# Patient Record
Sex: MALE | Race: WHITE | NOT HISPANIC OR LATINO | URBAN - NONMETROPOLITAN AREA
[De-identification: names, ages, dates, MRNs, and addresses within clinical notes are randomized per-mention and may not be internally consistent; named-entity substitution may affect disease eponyms.]

---

## 2019-04-11 ENCOUNTER — IMPORTED ENCOUNTER (OUTPATIENT)
Dept: URBAN - NONMETROPOLITAN AREA CLINIC 1 | Facility: CLINIC | Age: 54
End: 2019-04-11

## 2019-04-11 PROBLEM — H52.03: Noted: 2019-04-11

## 2019-04-11 PROBLEM — H52.4: Noted: 2019-04-11

## 2019-04-11 PROBLEM — H25.813: Noted: 2019-04-11

## 2019-04-11 PROCEDURE — S0620 ROUTINE OPHTHALMOLOGICAL EXA: HCPCS

## 2019-04-11 NOTE — PATIENT DISCUSSION
"NP Routine Eye Exam today 4/11/19""Hyperopia-Discussed diagnosis with patient. Presbyopia-Discussed diagnosis with patient. Updated spec Rx given. Recommend lens that will provide comfort as well as protect safety and health of eyes. Cataract OU-Not yet surgical. -Reviewed symptoms of advancing cataract growth such as glare and halos and decreased vision.-Continue to monitor for now. Pt will notify us if any new symptoms develop. 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 RTC 1 yr CEE"

## 2021-11-18 ENCOUNTER — IMPORTED ENCOUNTER (OUTPATIENT)
Dept: URBAN - NONMETROPOLITAN AREA CLINIC 1 | Facility: CLINIC | Age: 56
End: 2021-11-18

## 2021-11-18 PROBLEM — H52.4: Noted: 2021-11-18

## 2021-11-18 PROBLEM — H25.813: Noted: 2021-11-18

## 2021-11-18 PROBLEM — H52.223: Noted: 2021-11-18

## 2021-11-18 PROBLEM — E11.9: Noted: 2021-11-18

## 2021-11-18 PROBLEM — H04.223: Noted: 2021-11-18

## 2021-11-18 PROCEDURE — S0621 ROUTINE OPHTHALMOLOGICAL EXA: HCPCS

## 2021-11-18 NOTE — PATIENT DISCUSSION
DM s DR-Stressed the importance of keeping blood sugars under control blood pressure under control and weight normalization and regular visits with PCP. -Explained the possible effects of poorly controlled diabetes and the damage that diabetes can cause to ocular health. -Patient to check [id number].-Pt instructed to contact our office with any vision changes. -Recheck in 1 year with dilated examCataract OU-Not yet surgical. -Reviewed symptoms of advancing cataract growth such as glare and halos and decreased vision.-Continue to monitor for now. Pt will notify us if any new symptoms develop. -Recheck in 1 year with dilated examEpiphora OU-Longstanding no treatment recommendedAP OU-Rx issued for new glasses.

## 2022-04-09 ASSESSMENT — VISUAL ACUITY
OD_CC: 20/50
OS_CC: 20/60
OD_CC: 20/25-
OS_CC: 20/30-

## 2022-04-09 ASSESSMENT — TONOMETRY
OD_IOP_MMHG: 19
OS_IOP_MMHG: 17
OS_IOP_MMHG: 21
OD_IOP_MMHG: 17
